# Patient Record
Sex: MALE | Race: BLACK OR AFRICAN AMERICAN | URBAN - METROPOLITAN AREA
[De-identification: names, ages, dates, MRNs, and addresses within clinical notes are randomized per-mention and may not be internally consistent; named-entity substitution may affect disease eponyms.]

---

## 2019-04-26 ENCOUNTER — TELEPHONE (OUTPATIENT)
Dept: FAMILY MEDICINE CLINIC | Age: 59
End: 2019-04-26

## 2019-04-26 ENCOUNTER — OFFICE VISIT (OUTPATIENT)
Dept: FAMILY MEDICINE CLINIC | Age: 59
End: 2019-04-26

## 2019-04-26 VITALS
SYSTOLIC BLOOD PRESSURE: 192 MMHG | WEIGHT: 274 LBS | OXYGEN SATURATION: 95 % | DIASTOLIC BLOOD PRESSURE: 105 MMHG | RESPIRATION RATE: 16 BRPM | TEMPERATURE: 98.1 F | HEIGHT: 73 IN | HEART RATE: 70 BPM | BODY MASS INDEX: 36.31 KG/M2

## 2019-04-26 DIAGNOSIS — E11.65 TYPE 2 DIABETES MELLITUS WITH HYPERGLYCEMIA, WITH LONG-TERM CURRENT USE OF INSULIN (HCC): Primary | ICD-10-CM

## 2019-04-26 DIAGNOSIS — Z79.4 TYPE 2 DIABETES MELLITUS WITH HYPERGLYCEMIA, WITH LONG-TERM CURRENT USE OF INSULIN (HCC): Primary | ICD-10-CM

## 2019-04-26 DIAGNOSIS — E78.2 MIXED HYPERLIPIDEMIA: ICD-10-CM

## 2019-04-26 DIAGNOSIS — I10 ESSENTIAL HYPERTENSION: ICD-10-CM

## 2019-04-26 PROBLEM — E66.01 SEVERE OBESITY (HCC): Status: ACTIVE | Noted: 2019-04-26

## 2019-04-26 RX ORDER — METOPROLOL SUCCINATE 100 MG/1
TABLET, EXTENDED RELEASE ORAL DAILY
COMMUNITY
End: 2019-04-26 | Stop reason: SDUPTHER

## 2019-04-26 RX ORDER — INSULIN LISPRO 100 [IU]/ML
INJECTION, SOLUTION INTRAVENOUS; SUBCUTANEOUS
COMMUNITY

## 2019-04-26 RX ORDER — INSULIN PUMP SYRINGE, 3 ML
EACH MISCELLANEOUS
Qty: 1 KIT | Refills: 0 | Status: SHIPPED | OUTPATIENT
Start: 2019-04-26

## 2019-04-26 RX ORDER — ATORVASTATIN CALCIUM 20 MG/1
20 TABLET, FILM COATED ORAL
Qty: 30 TAB | Refills: 5 | Status: SHIPPED | OUTPATIENT
Start: 2019-04-26 | End: 2019-05-14

## 2019-04-26 RX ORDER — ATORVASTATIN CALCIUM 20 MG/1
TABLET, FILM COATED ORAL DAILY
COMMUNITY
End: 2019-04-26 | Stop reason: SDUPTHER

## 2019-04-26 RX ORDER — FUROSEMIDE 20 MG/1
TABLET ORAL DAILY
COMMUNITY
End: 2019-04-26

## 2019-04-26 RX ORDER — CLONIDINE HYDROCHLORIDE 0.2 MG/1
0.2 TABLET ORAL 2 TIMES DAILY
Qty: 60 TAB | Refills: 5 | Status: SHIPPED | OUTPATIENT
Start: 2019-04-26 | End: 2019-10-27 | Stop reason: SDUPTHER

## 2019-04-26 RX ORDER — LISINOPRIL 20 MG/1
TABLET ORAL DAILY
COMMUNITY
End: 2019-04-26

## 2019-04-26 RX ORDER — NIFEDIPINE 90 MG/1
90 TABLET, FILM COATED, EXTENDED RELEASE ORAL DAILY
COMMUNITY
End: 2019-04-26

## 2019-04-26 RX ORDER — METOPROLOL SUCCINATE 100 MG/1
100 TABLET, EXTENDED RELEASE ORAL DAILY
Qty: 30 TAB | Refills: 5 | Status: SHIPPED | OUTPATIENT
Start: 2019-04-26 | End: 2019-10-27 | Stop reason: SDUPTHER

## 2019-04-26 RX ORDER — LISINOPRIL 40 MG/1
40 TABLET ORAL DAILY
Qty: 30 TAB | Refills: 1 | Status: SHIPPED | OUTPATIENT
Start: 2019-04-26 | End: 2019-07-01 | Stop reason: SDUPTHER

## 2019-04-26 RX ORDER — LANCETS
EACH MISCELLANEOUS
Qty: 200 EACH | Refills: 11 | Status: SHIPPED | OUTPATIENT
Start: 2019-04-26

## 2019-04-26 RX ORDER — CLONIDINE HYDROCHLORIDE 0.2 MG/1
TABLET ORAL 2 TIMES DAILY
COMMUNITY
End: 2019-04-26 | Stop reason: SDUPTHER

## 2019-04-26 NOTE — PROGRESS NOTES
Chief Complaint Patient presents with  New Patient [facility] Establish Care Patient presents in office today as a NP to establish care. Would like to review his current medications to determine if he should continue his current regimen. Needs refills but would like to discuss with provider first. 
No other concerns. Learning Assessment 4/26/2019 PRIMARY LEARNER Patient PRIMARY LANGUAGE ENGLISH  
LEARNER PREFERENCE PRIMARY DEMONSTRATION  
ANSWERED BY self RELATIONSHIP SELF

## 2019-04-26 NOTE — TELEPHONE ENCOUNTER
----- Message from Camille Harvey sent at 2019 11:05 AM EDT -----  Regarding: Dr. Vo [phone]  Pt went to pharmacy to  prescriptions and was told 3 of the medications that he expected to pick were not there. The medications that were not available upon arrival were \"atorvastatin\" \"Lisinopril\" and \"Metoprolo succinate\".  Best contact [phone]

## 2019-04-26 NOTE — PROGRESS NOTES
Jacque Sanders is a 60 y.o. male Chief Complaint Patient presents with  New Patient  Establish Care  
 pt here to establish care and moved from [place]. Pt is a type 2 diabetic. He has run out of his water pill and his BP med nifedipine medication. Since stopping these two his swelling in his legs has gone away. Pt states he is very confused about his meds. Pt is here with his SO. Pt states he is using 31u of rapid acting 4x a day and 26 of long acting once at night. I confirmed again with pt this dosing was correct and he insists that it is. Pt would like to go back to work. Pt eats a very poor diet per SO 
he is a 60y.o. year old male who presents for evalution. Reviewed PmHx, RxHx, FmHx, SocHx, AllgHx and updated and dated in the chart. Review of Systems - negative except as listed above in the HPI Objective:  
 
Vitals:  
 04/26/19 5402 04/26/19 9509 BP: (!) 147/111 (!) 192/105 Pulse: 70 Resp: 16 Temp: 98.1 °F (36.7 °C) TempSrc: Oral   
SpO2: 95% Weight: 274 lb (124.3 kg) Height: 6' 1\" (1.854 m) Current Outpatient Medications Medication Sig  
 insulin lispro (ADMELOG U-100 INSULIN LISPRO) 100 unit/mL injection by SubCUTAneous route.  lisinopril (PRINIVIL, ZESTRIL) 40 mg tablet Take 1 Tab by mouth daily.  metoprolol succinate (TOPROL-XL) 100 mg tablet Take 1 Tab by mouth daily.  atorvastatin (LIPITOR) 20 mg tablet Take 1 Tab by mouth nightly.  cloNIDine HCl (CATAPRES) 0.2 mg tablet Take 1 Tab by mouth two (2) times a day.  Blood-Glucose Meter monitoring kit Check BG 5x daily on short and long acting insulin whichever is covered  lancets misc Check BG 5x daily on short and long acting insulin whichever is covered  glucose blood VI test strips (BLOOD GLUCOSE TEST) strip Check BG 5x daily on short and long acting insulin whichever is covered E11.65  
 insulin glargine U-300 conc (TOUJEO MAX U-300 SOLOSTAR) 300 unit/mL (3 mL) inpn 30 Units by SubCUTAneous route nightly. Whichever is covered by insurance No current facility-administered medications for this visit. Physical Examination: General appearance - alert, well appearing, and in no distress Chest - clear to auscultation, no wheezes, rales or rhonchi, symmetric air entry Heart - normal rate, regular rhythm, normal S1, S2, no murmurs, rubs, clicks or gallops Abdomen - soft, nontender, nondistended, no masses or organomegaly 
obese Extremities - peripheral pulses normal, no pedal edema, no clubbing or cyanosis Assessment/ Plan:  
Diagnoses and all orders for this visit: 
 
1. Type 2 diabetes mellitus with hyperglycemia, with long-term current use of insulin (HCC) -     LIPID PANEL 
-     METABOLIC PANEL, COMPREHENSIVE 
-     HEMOGLOBIN A1C WITH EAG 
-     MICROALBUMIN, UR, RAND W/ MICROALB/CREAT RATIO 
-     FUNDUS PHOTOGRAPHY 
-     Blood-Glucose Meter monitoring kit; Check BG 5x daily on short and long acting insulin whichever is covered 
-     lancets misc; Check BG 5x daily on short and long acting insulin whichever is covered 
-     glucose blood VI test strips (BLOOD GLUCOSE TEST) strip; Check BG 5x daily on short and long acting insulin whichever is covered E11.65 
-     insulin glargine U-300 conc (TOUJEO MAX U-300 SOLOSTAR) 300 unit/mL (3 mL) inpn; 30 Units by SubCUTAneous route nightly. Whichever is covered by insurance 2. Mixed hyperlipidemia -     LIPID PANEL 
-     METABOLIC PANEL, COMPREHENSIVE 
-     atorvastatin (LIPITOR) 20 mg tablet; Take 1 Tab by mouth nightly. 3. Essential hypertension 
-     lisinopril (PRINIVIL, ZESTRIL) 40 mg tablet; Take 1 Tab by mouth daily. -     metoprolol succinate (TOPROL-XL) 100 mg tablet; Take 1 Tab by mouth daily. -     cloNIDine HCl (CATAPRES) 0.2 mg tablet; Take 1 Tab by mouth two (2) times a day.  
 
 do not feel comfortable with dosing of short acting and pt is a poor historian so there is concern he may be incorrect with 31u rapid acting 45x daily. Will hold this for now check labs have pt f/u with glucose log in 1 week and change based on labs and log. Follow-up and Dispositions · Return in about 1 week (around 5/3/2019), or if symptoms worsen or fail to improve. I have discussed the diagnosis with the patient and the intended plan as seen in the above orders. The patient has received an after-visit summary and questions were answered concerning future plans. Pt conveyed understanding of plan. Medication Side Effects and Warnings were discussed with patient [id number] [facility] [address], DO

## 2019-04-26 NOTE — TELEPHONE ENCOUNTER
Called and spoke with pharmacy and confirmed the 3 medications were not showing up in their system. Gave verbal order for them exactly as ordered . Patient aware.

## 2019-04-26 NOTE — PATIENT INSTRUCTIONS

## 2019-04-27 LAB
ALBUMIN SERPL-MCNC: 4 G/DL (ref 3.5–5.5)
ALBUMIN/CREAT UR: 150.2 MG/G CREAT (ref 0–30)
ALBUMIN/GLOB SERPL: 1 {RATIO} (ref 1.2–2.2)
ALP SERPL-CCNC: 103 IU/L (ref 39–117)
ALT SERPL-CCNC: 21 IU/L (ref 0–44)
AST SERPL-CCNC: 15 IU/L (ref 0–40)
BILIRUB SERPL-MCNC: 1.2 MG/DL (ref 0–1.2)
BUN SERPL-MCNC: 11 MG/DL (ref 6–24)
BUN/CREAT SERPL: 8 (ref 9–20)
CALCIUM SERPL-MCNC: 8.9 MG/DL (ref 8.7–10.2)
CHLORIDE SERPL-SCNC: 102 MMOL/L (ref 96–106)
CHOLEST SERPL-MCNC: 177 MG/DL (ref 100–199)
CO2 SERPL-SCNC: 24 MMOL/L (ref 20–29)
CREAT SERPL-MCNC: 1.31 MG/DL (ref 0.76–1.27)
CREAT UR-MCNC: 313.6 MG/DL
EST. AVERAGE GLUCOSE BLD GHB EST-MCNC: 174 MG/DL
GLOBULIN SER CALC-MCNC: 3.9 G/DL (ref 1.5–4.5)
GLUCOSE SERPL-MCNC: 183 MG/DL (ref 65–99)
HBA1C MFR BLD: 7.7 % (ref 4.8–5.6)
HDLC SERPL-MCNC: 36 MG/DL
INTERPRETATION, 910389: NORMAL
INTERPRETATION: NORMAL
LDLC SERPL CALC-MCNC: 117 MG/DL (ref 0–99)
Lab: NORMAL
MICROALBUMIN UR-MCNC: 471 UG/ML
PDF IMAGE, 910387: NORMAL
POTASSIUM SERPL-SCNC: 3.9 MMOL/L (ref 3.5–5.2)
PROT SERPL-MCNC: 7.9 G/DL (ref 6–8.5)
SODIUM SERPL-SCNC: 141 MMOL/L (ref 134–144)
TRIGL SERPL-MCNC: 118 MG/DL (ref 0–149)
VLDLC SERPL CALC-MCNC: 24 MG/DL (ref 5–40)

## 2019-04-29 DIAGNOSIS — Z79.4 TYPE 2 DIABETES MELLITUS WITH HYPERGLYCEMIA, WITH LONG-TERM CURRENT USE OF INSULIN (HCC): Primary | ICD-10-CM

## 2019-04-29 DIAGNOSIS — E11.65 TYPE 2 DIABETES MELLITUS WITH HYPERGLYCEMIA, WITH LONG-TERM CURRENT USE OF INSULIN (HCC): Primary | ICD-10-CM

## 2019-04-29 RX ORDER — METFORMIN HYDROCHLORIDE 500 MG/1
500 TABLET ORAL 2 TIMES DAILY WITH MEALS
Qty: 60 TAB | Refills: 3 | Status: SHIPPED | OUTPATIENT
Start: 2019-04-29 | End: 2019-08-27 | Stop reason: SDUPTHER

## 2019-04-29 NOTE — PROGRESS NOTES
Diabetes at 7.7 and his renal function is fine for oral meds. I sent in metformin 500 bid with meals. C/w long acting insulin. If he is actively taking the lipitor 20 we should increase to 40 due to chol still too high.   If agreeable I will send in

## 2019-04-30 NOTE — PROGRESS NOTES
Patient id x 3, notified of labs and verbalized understanding. States that he agrees to metformin. Also states that he had been out of Lipitor for a month, is now restarting. Advised to return for labs in 3 months.

## 2019-05-01 LAB
LEFT EYE DIABETIC RETINOPATHY: ABNORMAL
LEFT EYE IMAGE QUALITY: ABNORMAL
LEFT EYE MACULAR EDEMA: ABNORMAL
LEFT EYE OTHER RETINOPATHY: ABNORMAL
RESULT: ABNORMAL
RIGHT EYE DIABETIC RETINOPATHY: ABNORMAL
RIGHT EYE IMAGE QUALITY: ABNORMAL
RIGHT EYE MACULAR EDEMA: ABNORMAL
RIGHT EYE OTHER RETINOPATHY: ABNORMAL
SEVERITY: ABNORMAL

## 2019-05-14 ENCOUNTER — OFFICE VISIT (OUTPATIENT)
Dept: FAMILY MEDICINE CLINIC | Age: 59
End: 2019-05-14

## 2019-05-14 VITALS
RESPIRATION RATE: 16 BRPM | SYSTOLIC BLOOD PRESSURE: 157 MMHG | DIASTOLIC BLOOD PRESSURE: 88 MMHG | WEIGHT: 264 LBS | HEART RATE: 88 BPM | HEIGHT: 73 IN | TEMPERATURE: 97.9 F | OXYGEN SATURATION: 95 % | BODY MASS INDEX: 34.99 KG/M2

## 2019-05-14 DIAGNOSIS — E11.65 TYPE 2 DIABETES MELLITUS WITH HYPERGLYCEMIA, WITH LONG-TERM CURRENT USE OF INSULIN (HCC): ICD-10-CM

## 2019-05-14 DIAGNOSIS — E78.2 MIXED HYPERLIPIDEMIA: ICD-10-CM

## 2019-05-14 DIAGNOSIS — Z79.4 TYPE 2 DIABETES MELLITUS WITH HYPERGLYCEMIA, WITH LONG-TERM CURRENT USE OF INSULIN (HCC): ICD-10-CM

## 2019-05-14 DIAGNOSIS — I10 ESSENTIAL HYPERTENSION: Primary | ICD-10-CM

## 2019-05-14 RX ORDER — ATORVASTATIN CALCIUM 40 MG/1
40 TABLET, FILM COATED ORAL
Qty: 30 TAB | Refills: 5 | Status: SHIPPED | OUTPATIENT
Start: 2019-05-14 | End: 2019-10-28 | Stop reason: SDUPTHER

## 2019-05-14 RX ORDER — INSULIN GLARGINE 100 [IU]/ML
30 INJECTION, SOLUTION SUBCUTANEOUS DAILY
Qty: 3 PEN | Refills: 5 | Status: SHIPPED | OUTPATIENT
Start: 2019-05-14 | End: 2019-08-30 | Stop reason: SDUPTHER

## 2019-05-14 RX ORDER — AMLODIPINE BESYLATE 5 MG/1
5 TABLET ORAL DAILY
Qty: 30 TAB | Refills: 1 | Status: SHIPPED | OUTPATIENT
Start: 2019-05-14 | End: 2019-06-11 | Stop reason: SDUPTHER

## 2019-05-14 NOTE — PROGRESS NOTES
Roe Pride is a 60 y.o. male had concerns including Follow-up and Back Pain. pt here for follow up and is takijg bp meds and his bp is improved but remains elevated. Pt has no issues with meds. His toujeo is not covered but was given a one time fill, seems lantus is preferred and will write this. Pt is also going to make a f/u with optho. Pt admits he has not been following a diabetic diet at all and has been eating candy etc.  Pt did not start 40 of lipitor and will send this in and recheck 2 months with diabetes. LDL remained above goal despite statin therapy. Again diet discussed    he is a 60y.o. year old male who presents for evalution. Reviewed PmHx, RxHx, FmHx, SocHx, AllgHx and updated and dated in the chart. Review of Systems - negative except as listed above in the HPI    Objective:     Vitals:    05/14/19 1344 05/14/19 1409   BP: (!) 160/105 157/88   Pulse: 88    Resp: 16    Temp: 97.9 °F (36.6 °C)    TempSrc: Oral    SpO2: 95%    Weight: 264 lb (119.7 kg)    Height: 6' 1\" (1.854 m)        Current Outpatient Medications   Medication Sig    amLODIPine (NORVASC) 5 mg tablet Take 1 Tab by mouth daily.  insulin glargine (LANTUS,BASAGLAR) 100 unit/mL (3 mL) inpn 30 Units by SubCUTAneous route daily.  atorvastatin (LIPITOR) 40 mg tablet Take 1 Tab by mouth nightly.  metFORMIN (GLUCOPHAGE) 500 mg tablet Take 1 Tab by mouth two (2) times daily (with meals).  insulin lispro (ADMELOG U-100 INSULIN LISPRO) 100 unit/mL injection by SubCUTAneous route.  lisinopril (PRINIVIL, ZESTRIL) 40 mg tablet Take 1 Tab by mouth daily.  metoprolol succinate (TOPROL-XL) 100 mg tablet Take 1 Tab by mouth daily.  cloNIDine HCl (CATAPRES) 0.2 mg tablet Take 1 Tab by mouth two (2) times a day.     Blood-Glucose Meter monitoring kit Check BG 5x daily on short and long acting insulin whichever is covered    lancets misc Check BG 5x daily on short and long acting insulin whichever is covered   Tariq glucose blood VI test strips (BLOOD GLUCOSE TEST) strip Check BG 5x daily on short and long acting insulin whichever is covered E11.65     No current facility-administered medications for this visit. Physical Examination: General appearance - alert, well appearing, and in no distress  Chest - clear to auscultation, no wheezes, rales or rhonchi, symmetric air entry  Heart - normal rate, regular rhythm, normal S1, S2, no murmurs, rubs, clicks or gallops      Assessment/ Plan:   Diagnoses and all orders for this visit:    1. Essential hypertension  -     amLODIPine (NORVASC) 5 mg tablet; Take 1 Tab by mouth daily. 2. Type 2 diabetes mellitus with hyperglycemia, with long-term current use of insulin (HCC)  -     insulin glargine (LANTUS,BASAGLAR) 100 unit/mL (3 mL) inpn; 30 Units by SubCUTAneous route daily. 3. Mixed hyperlipidemia  -     atorvastatin (LIPITOR) 40 mg tablet; Take 1 Tab by mouth nightly. Follow-up and Dispositions    · Return in about 1 month (around 6/11/2019), or if symptoms worsen or fail to improve, for bp check. I have discussed the diagnosis with the patient and the intended plan as seen in the above orders. The patient has received an after-visit summary and questions were answered concerning future plans. Pt conveyed understanding of plan.     Medication Side Effects and Warnings were discussed with patient      Ashley Elizabeth,

## 2019-05-14 NOTE — PATIENT INSTRUCTIONS
A Healthy Lifestyle: Care Instructions  Your Care Instructions    A healthy lifestyle can help you feel good, stay at a healthy weight, and have plenty of energy for both work and play. A healthy lifestyle is something you can share with your whole family. A healthy lifestyle also can lower your risk for serious health problems, such as high blood pressure, heart disease, and diabetes. You can follow a few steps listed below to improve your health and the health of your family. Follow-up care is a key part of your treatment and safety. Be sure to make and go to all appointments, and call your doctor if you are having problems. It's also a good idea to know your test results and keep a list of the medicines you take. How can you care for yourself at home? · Do not eat too much sugar, fat, or fast foods. You can still have dessert and treats now and then. The goal is moderation. · Start small to improve your eating habits. Pay attention to portion sizes, drink less juice and soda pop, and eat more fruits and vegetables. ? Eat a healthy amount of food. A 3-ounce serving of meat, for example, is about the size of a deck of cards. Fill the rest of your plate with vegetables and whole grains. ? Limit the amount of soda and sports drinks you have every day. Drink more water when you are thirsty. ? Eat at least 5 servings of fruits and vegetables every day. It may seem like a lot, but it is not hard to reach this goal. A serving or helping is 1 piece of fruit, 1 cup of vegetables, or 2 cups of leafy, raw vegetables. Have an apple or some carrot sticks as an afternoon snack instead of a candy bar. Try to have fruits and/or vegetables at every meal.  · Make exercise part of your daily routine. You may want to start with simple activities, such as walking, bicycling, or slow swimming. Try to be active 30 to 60 minutes every day. You do not need to do all 30 to 60 minutes all at once.  For example, you can exercise 3 times a day for 10 or 20 minutes. Moderate exercise is safe for most people, but it is always a good idea to talk to your doctor before starting an exercise program.  · Keep moving. [place] the lawn, work in the garden, or [employer]. Take the stairs instead of the elevator at work. · If you smoke, quit. People who smoke have an increased risk for heart attack, stroke, cancer, and other lung illnesses. Quitting is hard, but there are ways to boost your chance of quitting tobacco for good. ? Use nicotine gum, patches, or lozenges. ? Ask your doctor about stop-smoking programs and medicines. ? Keep trying. In addition to reducing your risk of diseases in the future, you will notice some benefits soon after you stop using tobacco. If you have shortness of breath or asthma symptoms, they will likely get better within a few weeks after you quit. · Limit how much alcohol you drink. Moderate amounts of alcohol (up to 2 drinks a day for men, 1 drink a day for women) are okay. But drinking too much can lead to liver problems, high blood pressure, and other health problems. Family health  If you have a family, there are many things you can do together to improve your health. · Eat meals together as a family as often as possible. · Eat healthy foods. This includes fruits, vegetables, lean meats and dairy, and whole grains. · Include your family in your fitness plan. Most people think of activities such as jogging or tennis as the way to fitness, but there are many ways you and your family can be more active. Anything that makes you breathe hard and gets your heart pumping is exercise. Here are some tips:  ? Walk to do errands or to take your child to school or the bus.  ? Go for a family bike ride after dinner instead of watching TV. Where can you learn more? Go to http://ajit-melissa.info/. Enter Y535 in the search box to learn more about \"A Healthy Lifestyle: Care Instructions. \"  Current as of: September 11, 2018  Content Version: 11.9  © 9411-4449 [employer]. Care instructions adapted under license by [employer] (which disclaims liability or warranty for this information). If you have questions about a medical condition or this instruction, always ask your healthcare professional. [address] any warranty or liability for your use of this information.

## 2019-05-14 NOTE — PROGRESS NOTES
Chief Complaint   Patient presents with    Follow-up    Back Pain     Patient presents in office today for 3 week f/u. Has c/o back pain. States that it keeps him up all night. No other concerns. 1. Have you been to the ER, urgent care clinic since your last visit? Hospitalized since your last visit? No    2. Have you seen or consulted any other health care providers outside of the [address] [place] [address] since your last visit? Include any pap smears or colon screening.  No    Learning Assessment 4/26/2019   PRIMARY LEARNER Patient   PRIMARY LANGUAGE ENGLISH   LEARNER PREFERENCE PRIMARY DEMONSTRATION   ANSWERED BY self   RELATIONSHIP SELF

## 2019-06-11 ENCOUNTER — OFFICE VISIT (OUTPATIENT)
Dept: FAMILY MEDICINE CLINIC | Age: 59
End: 2019-06-11

## 2019-06-11 VITALS
HEART RATE: 81 BPM | HEIGHT: 73 IN | DIASTOLIC BLOOD PRESSURE: 83 MMHG | TEMPERATURE: 98.1 F | SYSTOLIC BLOOD PRESSURE: 171 MMHG | RESPIRATION RATE: 18 BRPM | BODY MASS INDEX: 35.04 KG/M2 | WEIGHT: 264.4 LBS | OXYGEN SATURATION: 94 %

## 2019-06-11 DIAGNOSIS — I10 ESSENTIAL HYPERTENSION: ICD-10-CM

## 2019-06-11 RX ORDER — AMLODIPINE BESYLATE 10 MG/1
10 TABLET ORAL DAILY
Qty: 30 TAB | Refills: 1 | Status: SHIPPED | OUTPATIENT
Start: 2019-06-11

## 2019-06-11 NOTE — PROGRESS NOTES
Jacque Sanders is a 60 y.o. male    Chief Complaint   Patient presents with    Hypertension    Diabetes     1. Have you been to the ER, urgent care clinic since your last visit? Hospitalized since your last visit? No    2. Have you seen or consulted any other health care providers outside of the [address] [place] [address] since your last visit? Include any pap smears or colon screening.  No    Visit Vitals  BP (!) 177/102 (BP 1 Location: Left arm, BP Patient Position: Sitting)   Pulse 81   Temp 98.1 °F (36.7 °C) (Oral)   Resp 18   Ht 6' 1\" (1.854 m)   Wt 264 lb 6.4 oz (119.9 kg)   SpO2 94%   BMI 34.88 kg/m²

## 2019-06-11 NOTE — PROGRESS NOTES
Benjie Moeller is a 60 y.o. male   Chief Complaint   Patient presents with    Hypertension    Diabetes    pt here for follow up and is taking the norvasc 5mg once daily. Pt states he is also under increased stress. Pt is living with SO and states his girlfriends grandchildren are causing legal issues with police at house frequently. Pt currently on SSI and he would like to go back to work. Pt states on SSI for MI's. Pt is going to seek employment. he is a 60y.o. year old male who presents for evalution. Reviewed PmHx, RxHx, FmHx, SocHx, AllgHx and updated and dated in the chart. Review of Systems - negative except as listed above in the HPI    Objective:     Vitals:    06/11/19 1346 06/11/19 1417   BP: (!) 177/102 171/83   Pulse: 81    Resp: 18    Temp: 98.1 °F (36.7 °C)    TempSrc: Oral    SpO2: 94%    Weight: 264 lb 6.4 oz (119.9 kg)    Height: 6' 1\" (1.854 m)        Current Outpatient Medications   Medication Sig    amLODIPine (NORVASC) 10 mg tablet Take 1 Tab by mouth daily.  insulin glargine (LANTUS,BASAGLAR) 100 unit/mL (3 mL) inpn 30 Units by SubCUTAneous route daily.  atorvastatin (LIPITOR) 40 mg tablet Take 1 Tab by mouth nightly.  metFORMIN (GLUCOPHAGE) 500 mg tablet Take 1 Tab by mouth two (2) times daily (with meals).  lisinopril (PRINIVIL, ZESTRIL) 40 mg tablet Take 1 Tab by mouth daily.  metoprolol succinate (TOPROL-XL) 100 mg tablet Take 1 Tab by mouth daily.  cloNIDine HCl (CATAPRES) 0.2 mg tablet Take 1 Tab by mouth two (2) times a day.  Blood-Glucose Meter monitoring kit Check BG 5x daily on short and long acting insulin whichever is covered    lancets misc Check BG 5x daily on short and long acting insulin whichever is covered    glucose blood VI test strips (BLOOD GLUCOSE TEST) strip Check BG 5x daily on short and long acting insulin whichever is covered E11.65    insulin lispro (ADMELOG U-100 INSULIN LISPRO) 100 unit/mL injection by SubCUTAneous route. No current facility-administered medications for this visit. Physical Examination: General appearance - alert, well appearing, and in no distress  Mental status - alert, oriented to person, place, and time  Chest - clear to auscultation, no wheezes, rales or rhonchi, symmetric air entry  Heart - normal rate, regular rhythm, normal S1, S2, no murmurs, rubs, clicks or gallops      Assessment/ Plan:   Diagnoses and all orders for this visit:    1. Essential hypertension  -     amLODIPine (NORVASC) 10 mg tablet; Take 1 Tab by mouth daily. Follow-up and Dispositions    · Return in about 1 month (around 7/9/2019), or if symptoms worsen or fail to improve. I have discussed the diagnosis with the patient and the intended plan as seen in the above orders. The patient has received an after-visit summary and questions were answered concerning future plans. Pt conveyed understanding of plan.     Medication Side Effects and Warnings were discussed with patient      Baudilio Mcgowan, DO

## 2019-06-11 NOTE — PATIENT INSTRUCTIONS
A Healthy Lifestyle: Care Instructions Your Care Instructions A healthy lifestyle can help you feel good, stay at a healthy weight, and have plenty of energy for both work and play. A healthy lifestyle is something you can share with your whole family. A healthy lifestyle also can lower your risk for serious health problems, such as high blood pressure, heart disease, and diabetes. You can follow a few steps listed below to improve your health and the health of your family. Follow-up care is a key part of your treatment and safety. Be sure to make and go to all appointments, and call your doctor if you are having problems. It's also a good idea to know your test results and keep a list of the medicines you take. How can you care for yourself at home? · Do not eat too much sugar, fat, or fast foods. You can still have dessert and treats now and then. The goal is moderation. · Start small to improve your eating habits. Pay attention to portion sizes, drink less juice and soda pop, and eat more fruits and vegetables. ? Eat a healthy amount of food. A 3-ounce serving of meat, for example, is about the size of a deck of cards. Fill the rest of your plate with vegetables and whole grains. ? Limit the amount of soda and sports drinks you have every day. Drink more water when you are thirsty. ? Eat at least 5 servings of fruits and vegetables every day. It may seem like a lot, but it is not hard to reach this goal. A serving or helping is 1 piece of fruit, 1 cup of vegetables, or 2 cups of leafy, raw vegetables. Have an apple or some carrot sticks as an afternoon snack instead of a candy bar. Try to have fruits and/or vegetables at every meal. 
· Make exercise part of your daily routine. You may want to start with simple activities, such as walking, bicycling, or slow swimming. Try to be active 30 to 60 minutes every day.  You do not need to do all 30 to 60 minutes all at once. For example, you can exercise 3 times a day for 10 or 20 minutes. Moderate exercise is safe for most people, but it is always a good idea to talk to your doctor before starting an exercise program. 
· Keep moving. [place] the lawn, work in the garden, or [employer]. Take the stairs instead of the elevator at work. · If you smoke, quit. People who smoke have an increased risk for heart attack, stroke, cancer, and other lung illnesses. Quitting is hard, but there are ways to boost your chance of quitting tobacco for good. ? Use nicotine gum, patches, or lozenges. ? Ask your doctor about stop-smoking programs and medicines. ? Keep trying. In addition to reducing your risk of diseases in the future, you will notice some benefits soon after you stop using tobacco. If you have shortness of breath or asthma symptoms, they will likely get better within a few weeks after you quit. · Limit how much alcohol you drink. Moderate amounts of alcohol (up to 2 drinks a day for men, 1 drink a day for women) are okay. But drinking too much can lead to liver problems, high blood pressure, and other health problems. Family health If you have a family, there are many things you can do together to improve your health. · Eat meals together as a family as often as possible. · Eat healthy foods. This includes fruits, vegetables, lean meats and dairy, and whole grains. · Include your family in your fitness plan. Most people think of activities such as jogging or tennis as the way to fitness, but there are many ways you and your family can be more active. Anything that makes you breathe hard and gets your heart pumping is exercise. Here are some tips: 
? Walk to do errands or to take your child to school or the bus. 
? Go for a family bike ride after dinner instead of watching TV. Where can you learn more? Go to http://ajit-melissa.info/. Enter A943 in the search box to learn more about \"A Healthy Lifestyle: Care Instructions. \" Current as of: September 11, 2018 Content Version: 11.9 © 7580-9484 [employer]. Care instructions adapted under license by [employer] (which disclaims liability or warranty for this information). If you have questions about a medical condition or this instruction, always ask your healthcare professional. [place] [address] any warranty or liability for your use of this information.

## 2019-07-01 DIAGNOSIS — I10 ESSENTIAL HYPERTENSION: ICD-10-CM

## 2019-07-02 RX ORDER — LISINOPRIL 40 MG/1
TABLET ORAL
Qty: 30 TAB | Refills: 1 | Status: SHIPPED | OUTPATIENT
Start: 2019-07-02 | End: 2019-08-02 | Stop reason: SDUPTHER

## 2019-07-05 ENCOUNTER — OFFICE VISIT (OUTPATIENT)
Dept: FAMILY MEDICINE CLINIC | Age: 59
End: 2019-07-05

## 2019-07-05 VITALS
HEIGHT: 73 IN | SYSTOLIC BLOOD PRESSURE: 146 MMHG | BODY MASS INDEX: 34.19 KG/M2 | HEART RATE: 96 BPM | OXYGEN SATURATION: 96 % | RESPIRATION RATE: 16 BRPM | WEIGHT: 258 LBS | TEMPERATURE: 98.2 F | DIASTOLIC BLOOD PRESSURE: 90 MMHG

## 2019-07-05 DIAGNOSIS — I10 ESSENTIAL HYPERTENSION: Primary | ICD-10-CM

## 2019-07-05 DIAGNOSIS — M17.11 PRIMARY OSTEOARTHRITIS OF RIGHT KNEE: ICD-10-CM

## 2019-07-05 RX ORDER — CELECOXIB 200 MG/1
200 CAPSULE ORAL 2 TIMES DAILY
Qty: 60 CAP | Refills: 5 | Status: SHIPPED | OUTPATIENT
Start: 2019-07-05

## 2019-07-05 RX ORDER — CHLORTHALIDONE 25 MG/1
25 TABLET ORAL DAILY
Qty: 60 TAB | Refills: 1 | Status: SHIPPED | OUTPATIENT
Start: 2019-07-05 | End: 2019-08-29

## 2019-07-05 NOTE — PROGRESS NOTES
Hai Ahr is a 60 y.o. male   Chief Complaint   Patient presents with    Follow-up    pt here for BP f/u and is taking the norvasc 10mg once daily with no effect. BP has come down slightly but remains above goal.    Pt also with R knee arthritis and states had x-ray before and was told arthritis. Pt is not taking anything for this. Pt does have a slightly elevated Cr and anisha ltrial celebrex  he is a 60y.o. year old male who presents for evalution. Reviewed PmHx, RxHx, FmHx, SocHx, AllgHx and updated and dated in the chart. Review of Systems - negative except as listed above in the HPI    Objective:     Vitals:    07/05/19 1325 07/05/19 1339   BP: (!) 176/92 146/90   Pulse: 96    Resp: 16    Temp: 98.2 °F (36.8 °C)    TempSrc: Oral    SpO2: 96%    Weight: 258 lb (117 kg)    Height: 6' 1\" (1.854 m)        Current Outpatient Medications   Medication Sig    chlorthalidone (HYGROTEN) 25 mg tablet Take 1 Tab by mouth daily.  celecoxib (CELEBREX) 200 mg capsule Take 1 Cap by mouth two (2) times a day.  lisinopril (PRINIVIL, ZESTRIL) 40 mg tablet TAKE 1 TABLET BY MOUTH EVERY DAY    amLODIPine (NORVASC) 10 mg tablet Take 1 Tab by mouth daily.  insulin glargine (LANTUS,BASAGLAR) 100 unit/mL (3 mL) inpn 30 Units by SubCUTAneous route daily.  atorvastatin (LIPITOR) 40 mg tablet Take 1 Tab by mouth nightly.  metFORMIN (GLUCOPHAGE) 500 mg tablet Take 1 Tab by mouth two (2) times daily (with meals).  insulin lispro (ADMELOG U-100 INSULIN LISPRO) 100 unit/mL injection by SubCUTAneous route.  metoprolol succinate (TOPROL-XL) 100 mg tablet Take 1 Tab by mouth daily.  cloNIDine HCl (CATAPRES) 0.2 mg tablet Take 1 Tab by mouth two (2) times a day.     Blood-Glucose Meter monitoring kit Check BG 5x daily on short and long acting insulin whichever is covered    lancets misc Check BG 5x daily on short and long acting insulin whichever is covered    glucose blood VI test strips (BLOOD GLUCOSE TEST) strip Check BG 5x daily on short and long acting insulin whichever is covered E11.65     No current facility-administered medications for this visit. Physical Examination: General appearance - alert, well appearing, and in no distress  Mental status - alert, oriented to person, place, and time  Chest - clear to auscultation, no wheezes, rales or rhonchi, symmetric air entry  Heart - normal rate, regular rhythm, normal S1, S2, no murmurs, rubs, clicks or gallops      Assessment/ Plan:   Diagnoses and all orders for this visit:    1. Essential hypertension  -     chlorthalidone (HYGROTEN) 25 mg tablet; Take 1 Tab by mouth daily. 2. Primary osteoarthritis of right knee  -     celecoxib (CELEBREX) 200 mg capsule; Take 1 Cap by mouth two (2) times a day. Follow-up and Dispositions    · Return in about 3 weeks (around 7/26/2019), or if symptoms worsen or fail to improve. I have discussed the diagnosis with the patient and the intended plan as seen in the above orders. The patient has received an after-visit summary and questions were answered concerning future plans. Pt conveyed understanding of plan.     Medication Side Effects and Warnings were discussed with patient      Esau Brian DO

## 2019-07-05 NOTE — PROGRESS NOTES
Chief Complaint   Patient presents with    Follow-up     Patient presents in office today for 1 month f/u. States that he still has the knee pain. No other concerns. 1. Have you been to the ER, urgent care clinic since your last visit? Hospitalized since your last visit? No    2. Have you seen or consulted any other health care providers outside of the [address] [place] [address] since your last visit? Include any pap smears or colon screening.  No    Learning Assessment 4/26/2019   PRIMARY LEARNER Patient   PRIMARY LANGUAGE ENGLISH   LEARNER PREFERENCE PRIMARY DEMONSTRATION   ANSWERED BY self   RELATIONSHIP SELF

## 2019-07-05 NOTE — PATIENT INSTRUCTIONS
A Healthy Lifestyle: Care Instructions  Your Care Instructions    A healthy lifestyle can help you feel good, stay at a healthy weight, and have plenty of energy for both work and play. A healthy lifestyle is something you can share with your whole family. A healthy lifestyle also can lower your risk for serious health problems, such as high blood pressure, heart disease, and diabetes. You can follow a few steps listed below to improve your health and the health of your family. Follow-up care is a key part of your treatment and safety. Be sure to make and go to all appointments, and call your doctor if you are having problems. It's also a good idea to know your test results and keep a list of the medicines you take. How can you care for yourself at home? · Do not eat too much sugar, fat, or fast foods. You can still have dessert and treats now and then. The goal is moderation. · Start small to improve your eating habits. Pay attention to portion sizes, drink less juice and soda pop, and eat more fruits and vegetables. ? Eat a healthy amount of food. A 3-ounce serving of meat, for example, is about the size of a deck of cards. Fill the rest of your plate with vegetables and whole grains. ? Limit the amount of soda and sports drinks you have every day. Drink more water when you are thirsty. ? Eat at least 5 servings of fruits and vegetables every day. It may seem like a lot, but it is not hard to reach this goal. A serving or helping is 1 piece of fruit, 1 cup of vegetables, or 2 cups of leafy, raw vegetables. Have an apple or some carrot sticks as an afternoon snack instead of a candy bar. Try to have fruits and/or vegetables at every meal.  · Make exercise part of your daily routine. You may want to start with simple activities, such as walking, bicycling, or slow swimming. Try to be active 30 to 60 minutes every day. You do not need to do all 30 to 60 minutes all at once.  For example, you can exercise 3 times a day for 10 or 20 minutes. Moderate exercise is safe for most people, but it is always a good idea to talk to your doctor before starting an exercise program.  · Keep moving. [employer] [place] the lawn, work in the garden, or [employer]. Take the stairs instead of the elevator at work. · If you smoke, quit. People who smoke have an increased risk for heart attack, stroke, cancer, and other lung illnesses. Quitting is hard, but there are ways to boost your chance of quitting tobacco for good. ? Use nicotine gum, patches, or lozenges. ? Ask your doctor about stop-smoking programs and medicines. ? Keep trying. In addition to reducing your risk of diseases in the future, you will notice some benefits soon after you stop using tobacco. If you have shortness of breath or asthma symptoms, they will likely get better within a few weeks after you quit. · Limit how much alcohol you drink. Moderate amounts of alcohol (up to 2 drinks a day for men, 1 drink a day for women) are okay. But drinking too much can lead to liver problems, high blood pressure, and other health problems. Family health  If you have a family, there are many things you can do together to improve your health. · Eat meals together as a family as often as possible. · Eat healthy foods. This includes fruits, vegetables, lean meats and dairy, and whole grains. · Include your family in your fitness plan. Most people think of activities such as jogging or tennis as the way to fitness, but there are many ways you and your family can be more active. Anything that makes you breathe hard and gets your heart pumping is exercise. Here are some tips:  ? Walk to do errands or to take your child to school or the bus.  ? Go for a family bike ride after dinner instead of watching TV. Where can you learn more? Go to http://ajit-melissa.info/. Enter L526 in the search box to learn more about \"A Healthy Lifestyle: Care Instructions. \"  Current as of: September 11, 2018  Content Version: 11.9  © 0761-4177 [employer]. Care instructions adapted under license by [employer] (which disclaims liability or warranty for this information). If you have questions about a medical condition or this instruction, always ask your healthcare professional. [address] any warranty or liability for your use of this information.

## 2019-07-13 DIAGNOSIS — I10 ESSENTIAL HYPERTENSION: ICD-10-CM

## 2019-07-16 RX ORDER — AMLODIPINE BESYLATE 5 MG/1
TABLET ORAL
Qty: 30 TAB | Refills: 1 | Status: SHIPPED | OUTPATIENT
Start: 2019-07-16 | End: 2019-10-12 | Stop reason: SDUPTHER

## 2019-08-02 ENCOUNTER — DOCUMENTATION ONLY (OUTPATIENT)
Dept: FAMILY MEDICINE CLINIC | Age: 59
End: 2019-08-02

## 2019-08-02 DIAGNOSIS — I10 ESSENTIAL HYPERTENSION: ICD-10-CM

## 2019-08-02 RX ORDER — LISINOPRIL 40 MG/1
TABLET ORAL
Qty: 30 TAB | Refills: 1 | Status: SHIPPED | OUTPATIENT
Start: 2019-08-02

## 2019-08-27 DIAGNOSIS — Z79.4 TYPE 2 DIABETES MELLITUS WITH HYPERGLYCEMIA, WITH LONG-TERM CURRENT USE OF INSULIN (HCC): ICD-10-CM

## 2019-08-27 DIAGNOSIS — E11.65 TYPE 2 DIABETES MELLITUS WITH HYPERGLYCEMIA, WITH LONG-TERM CURRENT USE OF INSULIN (HCC): ICD-10-CM

## 2019-08-28 RX ORDER — METFORMIN HYDROCHLORIDE 500 MG/1
TABLET ORAL
Qty: 60 TAB | Refills: 3 | Status: SHIPPED | OUTPATIENT
Start: 2019-08-28

## 2019-08-29 ENCOUNTER — OFFICE VISIT (OUTPATIENT)
Dept: FAMILY MEDICINE CLINIC | Age: 59
End: 2019-08-29

## 2019-08-29 VITALS
WEIGHT: 248 LBS | DIASTOLIC BLOOD PRESSURE: 79 MMHG | OXYGEN SATURATION: 95 % | TEMPERATURE: 98.2 F | HEART RATE: 94 BPM | RESPIRATION RATE: 16 BRPM | BODY MASS INDEX: 32.87 KG/M2 | HEIGHT: 73 IN | SYSTOLIC BLOOD PRESSURE: 134 MMHG

## 2019-08-29 DIAGNOSIS — Z79.4 TYPE 2 DIABETES MELLITUS WITH HYPERGLYCEMIA, WITH LONG-TERM CURRENT USE OF INSULIN (HCC): Primary | ICD-10-CM

## 2019-08-29 DIAGNOSIS — I10 ESSENTIAL HYPERTENSION: ICD-10-CM

## 2019-08-29 DIAGNOSIS — Z11.59 SCREENING FOR VIRAL DISEASE: ICD-10-CM

## 2019-08-29 DIAGNOSIS — E11.65 TYPE 2 DIABETES MELLITUS WITH HYPERGLYCEMIA, WITH LONG-TERM CURRENT USE OF INSULIN (HCC): Primary | ICD-10-CM

## 2019-08-29 RX ORDER — CHLORTHALIDONE 50 MG/1
50 TABLET ORAL DAILY
Qty: 30 TAB | Refills: 1 | Status: SHIPPED | OUTPATIENT
Start: 2019-08-29 | End: 2019-10-27 | Stop reason: SDUPTHER

## 2019-08-29 NOTE — PROGRESS NOTES
Lon Anderson is a 60 y.o. male   Chief Complaint   Patient presents with    Follow-up    pt here for follow up on his BP and remains elevated. Pt is currently on 3 medications for this and tolerates thme fine. Pt is also IDDM and is due for a recheck, last was 7.7. Pt has improved his diet as well.      he is a 60y.o. year old male who presents for evalution. Reviewed PmHx, RxHx, FmHx, SocHx, AllgHx and updated and dated in the chart. Review of Systems - negative except as listed above in the HPI    Objective:     Vitals:    08/29/19 1322 08/29/19 1337   BP: (!) 156/95 134/79   Pulse: 94    Resp: 16    Temp: 98.2 °F (36.8 °C)    TempSrc: Oral    SpO2: 95%    Weight: 248 lb (112.5 kg)    Height: 6' 1\" (1.854 m)        Current Outpatient Medications   Medication Sig    chlorthalidone (HYGROTEN) 50 mg tablet Take 1 Tab by mouth daily.  metFORMIN (GLUCOPHAGE) 500 mg tablet TAKE 1 TABLET BY MOUTH TWICE A DAY WITH MEALS    lisinopril (PRINIVIL, ZESTRIL) 40 mg tablet TAKE 1 TABLET BY MOUTH EVERY DAY    celecoxib (CELEBREX) 200 mg capsule Take 1 Cap by mouth two (2) times a day.  amLODIPine (NORVASC) 10 mg tablet Take 1 Tab by mouth daily.  insulin glargine (LANTUS,BASAGLAR) 100 unit/mL (3 mL) inpn 30 Units by SubCUTAneous route daily.  atorvastatin (LIPITOR) 40 mg tablet Take 1 Tab by mouth nightly.  insulin lispro (ADMELOG U-100 INSULIN LISPRO) 100 unit/mL injection by SubCUTAneous route.  metoprolol succinate (TOPROL-XL) 100 mg tablet Take 1 Tab by mouth daily.  cloNIDine HCl (CATAPRES) 0.2 mg tablet Take 1 Tab by mouth two (2) times a day.     Blood-Glucose Meter monitoring kit Check BG 5x daily on short and long acting insulin whichever is covered    lancets misc Check BG 5x daily on short and long acting insulin whichever is covered    glucose blood VI test strips (BLOOD GLUCOSE TEST) strip Check BG 5x daily on short and long acting insulin whichever is covered E11.65    amLODIPine (NORVASC) 5 mg tablet TAKE 1 TABLET BY MOUTH EVERY DAY     No current facility-administered medications for this visit. Physical Examination: General appearance - alert, well appearing, and in no distress  Chest - clear to auscultation, no wheezes, rales or rhonchi, symmetric air entry  Heart - normal rate, regular rhythm, normal S1, S2, no murmurs, rubs, clicks or gallops      Assessment/ Plan:   Diagnoses and all orders for this visit:    1. Type 2 diabetes mellitus with hyperglycemia, with long-term current use of insulin (HCC)  -     HEMOGLOBIN A1C WITH EAG  -     METABOLIC PANEL, BASIC    2. Essential hypertension  -     chlorthalidone (HYGROTEN) 50 mg tablet; Take 1 Tab by mouth daily. 3. Screening for viral disease  -     HEPATITIS C AB       Follow-up and Dispositions    · Return in about 3 months (around 11/29/2019), or if symptoms worsen or fail to improve. I have discussed the diagnosis with the patient and the intended plan as seen in the above orders. The patient has received an after-visit summary and questions were answered concerning future plans. Pt conveyed understanding of plan.     Medication Side Effects and Warnings were discussed with patient      Mariely Gorman DO

## 2019-08-29 NOTE — PATIENT INSTRUCTIONS
A Healthy Lifestyle: Care Instructions  Your Care Instructions    A healthy lifestyle can help you feel good, stay at a healthy weight, and have plenty of energy for both work and play. A healthy lifestyle is something you can share with your whole family. A healthy lifestyle also can lower your risk for serious health problems, such as high blood pressure, heart disease, and diabetes. You can follow a few steps listed below to improve your health and the health of your family. Follow-up care is a key part of your treatment and safety. Be sure to make and go to all appointments, and call your doctor if you are having problems. It's also a good idea to know your test results and keep a list of the medicines you take. How can you care for yourself at home? · Do not eat too much sugar, fat, or fast foods. You can still have dessert and treats now and then. The goal is moderation. · Start small to improve your eating habits. Pay attention to portion sizes, drink less juice and soda pop, and eat more fruits and vegetables. ? Eat a healthy amount of food. A 3-ounce serving of meat, for example, is about the size of a deck of cards. Fill the rest of your plate with vegetables and whole grains. ? Limit the amount of soda and sports drinks you have every day. Drink more water when you are thirsty. ? Eat at least 5 servings of fruits and vegetables every day. It may seem like a lot, but it is not hard to reach this goal. A serving or helping is 1 piece of fruit, 1 cup of vegetables, or 2 cups of leafy, raw vegetables. Have an apple or some carrot sticks as an afternoon snack instead of a candy bar. Try to have fruits and/or vegetables at every meal.  · Make exercise part of your daily routine. You may want to start with simple activities, such as walking, bicycling, or slow swimming. Try to be active 30 to 60 minutes every day. You do not need to do all 30 to 60 minutes all at once.  For example, you can exercise 3 times a day for 10 or 20 minutes. Moderate exercise is safe for most people, but it is always a good idea to talk to your doctor before starting an exercise program.  · Keep moving. [place] [place] the lawn, work in the garden, or [employer]. Take the stairs instead of the elevator at work. · If you smoke, quit. People who smoke have an increased risk for heart attack, stroke, cancer, and other lung illnesses. Quitting is hard, but there are ways to boost your chance of quitting tobacco for good. ? Use nicotine gum, patches, or lozenges. ? Ask your doctor about stop-smoking programs and medicines. ? Keep trying. In addition to reducing your risk of diseases in the future, you will notice some benefits soon after you stop using tobacco. If you have shortness of breath or asthma symptoms, they will likely get better within a few weeks after you quit. · Limit how much alcohol you drink. Moderate amounts of alcohol (up to 2 drinks a day for men, 1 drink a day for women) are okay. But drinking too much can lead to liver problems, high blood pressure, and other health problems. Family health  If you have a family, there are many things you can do together to improve your health. · Eat meals together as a family as often as possible. · Eat healthy foods. This includes fruits, vegetables, lean meats and dairy, and whole grains. · Include your family in your fitness plan. Most people think of activities such as jogging or tennis as the way to fitness, but there are many ways you and your family can be more active. Anything that makes you breathe hard and gets your heart pumping is exercise. Here are some tips:  ? Walk to do errands or to take your child to school or the bus.  ? Go for a family bike ride after dinner instead of watching TV. Where can you learn more? Go to http://ajit-melissa.info/. Enter Q518 in the search box to learn more about \"A Healthy Lifestyle: Care Instructions. \"  Current as of: September 11, 2018  Content Version: 12.1  © 0160-1699 [employer]. Care instructions adapted under license by [employer] (which disclaims liability or warranty for this information). If you have questions about a medical condition or this instruction, always ask your healthcare professional. [address] any warranty or liability for your use of this information.

## 2019-08-29 NOTE — PROGRESS NOTES
Chief Complaint   Patient presents with    Follow-up     Patient presents in office today for 2 month f/u. No concerns. 1. Have you been to the ER, urgent care clinic since your last visit? Hospitalized since your last visit? No    2. Have you seen or consulted any other health care providers outside of the [address] [place] [address] since your last visit? Include any pap smears or colon screening.  No      Learning Assessment 4/26/2019   PRIMARY LEARNER Patient   PRIMARY LANGUAGE ENGLISH   LEARNER PREFERENCE PRIMARY DEMONSTRATION   ANSWERED BY self   RELATIONSHIP SELF

## 2019-08-30 DIAGNOSIS — E11.65 TYPE 2 DIABETES MELLITUS WITH HYPERGLYCEMIA, WITH LONG-TERM CURRENT USE OF INSULIN (HCC): ICD-10-CM

## 2019-08-30 DIAGNOSIS — Z79.4 TYPE 2 DIABETES MELLITUS WITH HYPERGLYCEMIA, WITH LONG-TERM CURRENT USE OF INSULIN (HCC): ICD-10-CM

## 2019-08-30 LAB
BUN SERPL-MCNC: 22 MG/DL (ref 6–24)
BUN/CREAT SERPL: 14 (ref 9–20)
CALCIUM SERPL-MCNC: 9.2 MG/DL (ref 8.7–10.2)
CHLORIDE SERPL-SCNC: 96 MMOL/L (ref 96–106)
CO2 SERPL-SCNC: 25 MMOL/L (ref 20–29)
CREAT SERPL-MCNC: 1.54 MG/DL (ref 0.76–1.27)
EST. AVERAGE GLUCOSE BLD GHB EST-MCNC: 306 MG/DL
GLUCOSE SERPL-MCNC: 438 MG/DL (ref 65–99)
HBA1C MFR BLD: 12.3 % (ref 4.8–5.6)
HCV AB S/CO SERPL IA: <0.1 S/CO RATIO (ref 0–0.9)
INTERPRETATION: NORMAL
Lab: NORMAL
POTASSIUM SERPL-SCNC: 3.7 MMOL/L (ref 3.5–5.2)
SODIUM SERPL-SCNC: 135 MMOL/L (ref 134–144)

## 2019-08-30 RX ORDER — INSULIN GLARGINE 100 [IU]/ML
45 INJECTION, SOLUTION SUBCUTANEOUS DAILY
Qty: 5 PEN | Refills: 5 | Status: SHIPPED | OUTPATIENT
Start: 2019-08-30 | End: 2019-09-26 | Stop reason: SDUPTHER

## 2019-08-30 NOTE — PROGRESS NOTES
Diabetes is very uncontrolled and has shot up to 12.3 from 7.7, increase long acting insulin to 45 units daily and I am adding on an oral med called tradjenta. He can get a coupon online or from our office to decrease copay. His kidney function also declined and he needs to follow up in 2-4 weeks for a recheck and with a glucose log so we can adjust meds if needed.

## 2019-09-05 ENCOUNTER — TELEPHONE (OUTPATIENT)
Dept: FAMILY MEDICINE CLINIC | Age: 59
End: 2019-09-05

## 2019-09-05 NOTE — TELEPHONE ENCOUNTER
RC to patient. Called and spoke with patient in reference to labs. See result note for documentation.

## 2019-09-05 NOTE — PROGRESS NOTES
2nd attempt. Called and spoke with patient in reference to labs. Verbalized understanding. Apt scheduled for 9/26/19.

## 2019-09-05 NOTE — TELEPHONE ENCOUNTER
----- Message from Syed Ramirez sent at 9/4/2019  5:29 PM EDT -----  Regarding: Dr. Kathy Walden   Patient return call    Caller's first and last name and relationship (if not the patient):      Best contact number(s): [phone]      Whose call is being returned: Pt       Details to clarify the request: pt is returning the call back to the practice from a nurse.       Channel Latha Thomas

## 2019-09-26 ENCOUNTER — OFFICE VISIT (OUTPATIENT)
Dept: FAMILY MEDICINE CLINIC | Age: 59
End: 2019-09-26

## 2019-09-26 VITALS
DIASTOLIC BLOOD PRESSURE: 87 MMHG | OXYGEN SATURATION: 95 % | WEIGHT: 248 LBS | HEIGHT: 73 IN | SYSTOLIC BLOOD PRESSURE: 134 MMHG | HEART RATE: 80 BPM | RESPIRATION RATE: 16 BRPM | BODY MASS INDEX: 32.87 KG/M2 | TEMPERATURE: 98.2 F

## 2019-09-26 DIAGNOSIS — E11.65 TYPE 2 DIABETES MELLITUS WITH HYPERGLYCEMIA, WITH LONG-TERM CURRENT USE OF INSULIN (HCC): ICD-10-CM

## 2019-09-26 DIAGNOSIS — R79.89 ELEVATED SERUM CREATININE: Primary | ICD-10-CM

## 2019-09-26 DIAGNOSIS — Z79.4 TYPE 2 DIABETES MELLITUS WITH HYPERGLYCEMIA, WITH LONG-TERM CURRENT USE OF INSULIN (HCC): ICD-10-CM

## 2019-09-26 RX ORDER — REPAGLINIDE 1 MG/1
1 TABLET ORAL
Qty: 90 TAB | Refills: 5 | Status: SHIPPED | OUTPATIENT
Start: 2019-09-26

## 2019-09-26 RX ORDER — INSULIN GLARGINE 100 [IU]/ML
60 INJECTION, SOLUTION SUBCUTANEOUS DAILY
Qty: 5 PEN | Refills: 5 | Status: SHIPPED | OUTPATIENT
Start: 2019-09-26

## 2019-09-26 NOTE — PROGRESS NOTES
Chief Complaint   Patient presents with   [address]     Patient presents in office today for 4 week f/u and labs. States that his sugars have been between 300-400 at home. No others concerns. 1. Have you been to the ER, urgent care clinic since your last visit? Hospitalized since your last visit? No    2. Have you seen or consulted any other health care providers outside of the [address] [place] [address] since your last visit? Include any pap smears or colon screening.  No    Learning Assessment 4/26/2019   PRIMARY LEARNER Patient   PRIMARY LANGUAGE ENGLISH   LEARNER PREFERENCE PRIMARY DEMONSTRATION   ANSWERED BY self   RELATIONSHIP SELF

## 2019-09-26 NOTE — PROGRESS NOTES
Heaven Castillo is a 60 y.o. male   Chief Complaint   Patient presents with    Follow-up    Labs    pt ehre with glucose log and he has a few glucose levels and these are mostly in the 400's and 2 in the 300's nothing less. These are in the morning and HS. Kidney function was little off last check too and states he has been staying hydrated drinking water. he is a 60y.o. year old male who presents for evalution. Reviewed PmHx, RxHx, FmHx, SocHx, AllgHx and updated and dated in the chart. Review of Systems - negative except as listed above in the HPI    Objective:     Vitals:    09/26/19 1329   BP: 134/87   Pulse: 80   Resp: 16   Temp: 98.2 °F (36.8 °C)   TempSrc: Oral   SpO2: 95%   Weight: 248 lb (112.5 kg)   Height: 6' 1\" (1.854 m)       Current Outpatient Medications   Medication Sig    linaGLIPtin (TRADJENTA) 5 mg tablet Take 1 Tab by mouth daily.  repaglinide (PRANDIN) 1 mg tablet Take 1 Tab by mouth Before breakfast, lunch, and dinner.  insulin glargine (LANTUS,BASAGLAR) 100 unit/mL (3 mL) inpn 60 Units by SubCUTAneous route daily. basaglar    chlorthalidone (HYGROTEN) 50 mg tablet Take 1 Tab by mouth daily.  metFORMIN (GLUCOPHAGE) 500 mg tablet TAKE 1 TABLET BY MOUTH TWICE A DAY WITH MEALS    lisinopril (PRINIVIL, ZESTRIL) 40 mg tablet TAKE 1 TABLET BY MOUTH EVERY DAY    celecoxib (CELEBREX) 200 mg capsule Take 1 Cap by mouth two (2) times a day.  amLODIPine (NORVASC) 10 mg tablet Take 1 Tab by mouth daily.  atorvastatin (LIPITOR) 40 mg tablet Take 1 Tab by mouth nightly.  insulin lispro (ADMELOG U-100 INSULIN LISPRO) 100 unit/mL injection by SubCUTAneous route.  metoprolol succinate (TOPROL-XL) 100 mg tablet Take 1 Tab by mouth daily.  cloNIDine HCl (CATAPRES) 0.2 mg tablet Take 1 Tab by mouth two (2) times a day.     Blood-Glucose Meter monitoring kit Check BG 5x daily on short and long acting insulin whichever is covered    lancets misc Check BG 5x daily on short and long acting insulin whichever is covered    glucose blood VI test strips (BLOOD GLUCOSE TEST) strip Check BG 5x daily on short and long acting insulin whichever is covered E11.65    amLODIPine (NORVASC) 5 mg tablet TAKE 1 TABLET BY MOUTH EVERY DAY     No current facility-administered medications for this visit. Physical Examination: General appearance - alert, well appearing, and in no distress  Chest - clear to auscultation, no wheezes, rales or rhonchi, symmetric air entry  Heart - normal rate, regular rhythm, normal S1, S2, no murmurs, rubs, clicks or gallops      Assessment/ Plan:   Diagnoses and all orders for this visit:    1. Elevated serum creatinine  -     METABOLIC PANEL, BASIC    2. Type 2 diabetes mellitus with hyperglycemia, with long-term current use of insulin (HCC)  -     linaGLIPtin (TRADJENTA) 5 mg tablet; Take 1 Tab by mouth daily. -     repaglinide (PRANDIN) 1 mg tablet; Take 1 Tab by mouth Before breakfast, lunch, and dinner.  -     insulin glargine (LANTUS,BASAGLAR) 100 unit/mL (3 mL) inpn; 60 Units by SubCUTAneous route daily. basaglar       Follow-up and Dispositions    · Return in about 2 months (around 11/26/2019), or if symptoms worsen or fail to improve. I have discussed the diagnosis with the patient and the intended plan as seen in the above orders. The patient has received an after-visit summary and questions were answered concerning future plans. Pt conveyed understanding of plan.     Medication Side Effects and Warnings were discussed with patient      Kai Mckeon DO

## 2019-09-26 NOTE — PATIENT INSTRUCTIONS

## 2019-09-27 LAB
BUN SERPL-MCNC: 24 MG/DL (ref 6–24)
BUN/CREAT SERPL: 15 (ref 9–20)
CALCIUM SERPL-MCNC: 9.4 MG/DL (ref 8.7–10.2)
CHLORIDE SERPL-SCNC: 99 MMOL/L (ref 96–106)
CO2 SERPL-SCNC: 26 MMOL/L (ref 20–29)
CREAT SERPL-MCNC: 1.65 MG/DL (ref 0.76–1.27)
GLUCOSE SERPL-MCNC: 243 MG/DL (ref 65–99)
INTERPRETATION: NORMAL
POTASSIUM SERPL-SCNC: 3.6 MMOL/L (ref 3.5–5.2)
SODIUM SERPL-SCNC: 140 MMOL/L (ref 134–144)

## 2019-10-01 NOTE — PROGRESS NOTES
Kidney fuinction has worsened if he is taking metformin then stop it. Recheck kidney labs in 4 weeks. No NSAID's. Glucose elevated but better and we just changed his insulin to 60u.

## 2019-10-03 ENCOUNTER — TELEPHONE (OUTPATIENT)
Dept: FAMILY MEDICINE CLINIC | Age: 59
End: 2019-10-03

## 2019-10-03 NOTE — TELEPHONE ENCOUNTER
----- Message from Keo Sina sent at 10/2/2019  5:21 PM EDT -----  Regarding: Dr. Demetrius Nissen   Patient return call    Caller's first and last name and relationship (if not the patient):      Best contact number(s):[phone]      Whose call is being returned: Nurse about lab results       Details to clarify the request:  Pt was returning the call for lab results.     Channel Perla Spurling

## 2019-10-04 NOTE — PROGRESS NOTES
3rd attempt. Called and spoke with patient in reference to labs. Verbalized understanding.  Apt scheduled for 10/30/19

## 2019-10-07 ENCOUNTER — TELEPHONE (OUTPATIENT)
Dept: FAMILY MEDICINE CLINIC | Age: 59
End: 2019-10-07

## 2019-10-07 NOTE — TELEPHONE ENCOUNTER
----- Message from Yady Downing sent at 10/5/2019  1:18 PM EDT -----  Regarding: Dr. Vicky Negrete  Patient return call    Caller's first and last name and relationship (if not the patient):  Pt    Best contact number(s):  [phone] (pt)    Whose call is being returned:   The practice    Details to clarify the request:  Pt is returning call    Yady Downing

## 2019-10-08 NOTE — TELEPHONE ENCOUNTER
Called and spoke with patient. He states he has already been spoken to in regarding to labs and nothing further is needed at this time.

## 2019-10-12 DIAGNOSIS — I10 ESSENTIAL HYPERTENSION: ICD-10-CM

## 2019-10-15 RX ORDER — AMLODIPINE BESYLATE 5 MG/1
TABLET ORAL
Qty: 30 TAB | Refills: 1 | Status: SHIPPED | OUTPATIENT
Start: 2019-10-15 | End: 2019-12-13 | Stop reason: SDUPTHER

## 2019-10-27 DIAGNOSIS — I10 ESSENTIAL HYPERTENSION: ICD-10-CM

## 2019-10-28 DIAGNOSIS — E78.2 MIXED HYPERLIPIDEMIA: ICD-10-CM

## 2019-10-29 RX ORDER — CHLORTHALIDONE 25 MG/1
TABLET ORAL
Qty: 30 TAB | Refills: 3 | Status: SHIPPED | OUTPATIENT
Start: 2019-10-29

## 2019-10-29 RX ORDER — ATORVASTATIN CALCIUM 40 MG/1
40 TABLET, FILM COATED ORAL
Qty: 30 TAB | Refills: 5 | Status: SHIPPED | OUTPATIENT
Start: 2019-10-29

## 2019-10-29 RX ORDER — CLONIDINE HYDROCHLORIDE 0.2 MG/1
TABLET ORAL
Qty: 60 TAB | Refills: 5 | Status: SHIPPED | OUTPATIENT
Start: 2019-10-29 | End: 2020-06-02

## 2019-10-29 RX ORDER — CHLORTHALIDONE 50 MG/1
TABLET ORAL
Qty: 30 TAB | Refills: 1 | Status: SHIPPED | OUTPATIENT
Start: 2019-10-29

## 2019-10-29 RX ORDER — METOPROLOL SUCCINATE 100 MG/1
TABLET, EXTENDED RELEASE ORAL
Qty: 30 TAB | Refills: 5 | Status: SHIPPED | OUTPATIENT
Start: 2019-10-29 | End: 2020-06-09

## 2019-12-13 DIAGNOSIS — I10 ESSENTIAL HYPERTENSION: ICD-10-CM

## 2019-12-13 RX ORDER — AMLODIPINE BESYLATE 5 MG/1
TABLET ORAL
Qty: 30 TAB | Refills: 1 | Status: SHIPPED | OUTPATIENT
Start: 2019-12-13 | End: 2020-02-18

## 2020-02-17 DIAGNOSIS — I10 ESSENTIAL HYPERTENSION: ICD-10-CM

## 2020-02-18 RX ORDER — AMLODIPINE BESYLATE 5 MG/1
TABLET ORAL
Qty: 30 TAB | Refills: 1 | Status: SHIPPED | OUTPATIENT
Start: 2020-02-18

## 2020-06-02 DIAGNOSIS — I10 ESSENTIAL HYPERTENSION: ICD-10-CM

## 2020-06-02 RX ORDER — CLONIDINE HYDROCHLORIDE 0.2 MG/1
TABLET ORAL
Qty: 60 TAB | Refills: 1 | Status: SHIPPED | OUTPATIENT
Start: 2020-06-02 | End: 2020-07-23

## 2020-06-06 DIAGNOSIS — I10 ESSENTIAL HYPERTENSION: ICD-10-CM

## 2020-06-09 RX ORDER — METOPROLOL SUCCINATE 100 MG/1
TABLET, EXTENDED RELEASE ORAL
Qty: 30 TAB | Refills: 0 | Status: SHIPPED | OUTPATIENT
Start: 2020-06-09

## 2020-07-23 DIAGNOSIS — I10 ESSENTIAL HYPERTENSION: ICD-10-CM

## 2020-07-23 RX ORDER — CLONIDINE HYDROCHLORIDE 0.2 MG/1
TABLET ORAL
Qty: 60 TAB | Refills: 0 | Status: SHIPPED | OUTPATIENT
Start: 2020-07-23 | End: 2020-08-18

## 2020-07-23 NOTE — TELEPHONE ENCOUNTER
Called and spoke with patient. Advised that he is due for labs for further refills. Patient verbalized understanding. Apt scheduled for 7/31/20.

## 2020-08-18 DIAGNOSIS — I10 ESSENTIAL HYPERTENSION: ICD-10-CM

## 2020-08-18 RX ORDER — CLONIDINE HYDROCHLORIDE 0.2 MG/1
TABLET ORAL
Qty: 60 TAB | Refills: 0 | Status: SHIPPED | OUTPATIENT
Start: 2020-08-18 | End: 2020-09-16

## 2020-09-15 DIAGNOSIS — I10 ESSENTIAL HYPERTENSION: ICD-10-CM

## 2020-09-16 RX ORDER — CLONIDINE HYDROCHLORIDE 0.2 MG/1
TABLET ORAL
Qty: 60 TAB | Refills: 0 | Status: SHIPPED | OUTPATIENT
Start: 2020-09-16 | End: 2020-10-13

## 2020-10-11 DIAGNOSIS — I10 ESSENTIAL HYPERTENSION: ICD-10-CM

## 2020-10-11 NOTE — LETTER
10/13/2020 1:38 PM 
 
Mr. Aguayo Bias [address] [phone] Dear  Chaim Putnam: 
 
Rhianna Tsang missed you! Please call our office at [phone] and schedule a follow up appointment for your continued care and further medication refills. Sincerely, Shivani Reddy, DO

## 2020-10-13 RX ORDER — CLONIDINE HYDROCHLORIDE 0.2 MG/1
TABLET ORAL
Qty: 60 TAB | Refills: 0 | Status: SHIPPED | OUTPATIENT
Start: 2020-10-13 | End: 2020-11-10

## 2020-10-15 DIAGNOSIS — Z79.4 TYPE 2 DIABETES MELLITUS WITH HYPERGLYCEMIA, WITH LONG-TERM CURRENT USE OF INSULIN (HCC): ICD-10-CM

## 2020-10-15 DIAGNOSIS — E11.65 TYPE 2 DIABETES MELLITUS WITH HYPERGLYCEMIA, WITH LONG-TERM CURRENT USE OF INSULIN (HCC): ICD-10-CM

## 2020-10-15 RX ORDER — REPAGLINIDE 1 MG/1
TABLET ORAL
Qty: 90 TAB | Refills: 5 | OUTPATIENT
Start: 2020-10-15

## 2020-10-15 NOTE — LETTER
10/15/2020 2:09 PM 
 
Mr. Hurt Deal [address] [phone] Dear  Juan Antonio Maris: 
 
Krysten Combs missed you! Please call our office at [phone] and schedule a follow up appointment for your continued care. We are unable to renew any medication refills at this time, as it has been over a year since we've seen you. Sincerely, Irineo Denny, DO

## 2020-11-08 DIAGNOSIS — I10 ESSENTIAL HYPERTENSION: ICD-10-CM

## 2020-11-10 RX ORDER — CLONIDINE HYDROCHLORIDE 0.2 MG/1
TABLET ORAL
Qty: 60 TAB | Refills: 0 | Status: SHIPPED | OUTPATIENT
Start: 2020-11-10

## 2022-03-19 PROBLEM — E66.01 SEVERE OBESITY (HCC): Status: ACTIVE | Noted: 2019-04-26

## 2023-05-23 RX ORDER — AMLODIPINE BESYLATE 10 MG/1
10 TABLET ORAL DAILY
COMMUNITY
Start: 2019-06-11

## 2023-05-23 RX ORDER — CHLORTHALIDONE 25 MG/1
1 TABLET ORAL DAILY
COMMUNITY
Start: 2019-10-29

## 2023-05-23 RX ORDER — AMLODIPINE BESYLATE 5 MG/1
1 TABLET ORAL DAILY
COMMUNITY
Start: 2020-02-18

## 2023-05-23 RX ORDER — REPAGLINIDE 1 MG/1
1 TABLET ORAL
COMMUNITY
Start: 2019-09-26

## 2023-05-23 RX ORDER — CHLORTHALIDONE 50 MG/1
1 TABLET ORAL DAILY
COMMUNITY
Start: 2019-10-29

## 2023-05-23 RX ORDER — LANCETS 30 GAUGE
EACH MISCELLANEOUS
COMMUNITY
Start: 2019-04-26

## 2023-05-23 RX ORDER — LISINOPRIL 40 MG/1
1 TABLET ORAL DAILY
COMMUNITY
Start: 2019-08-02

## 2023-05-23 RX ORDER — INSULIN GLARGINE 100 [IU]/ML
60 INJECTION, SOLUTION SUBCUTANEOUS DAILY
COMMUNITY
Start: 2019-09-26

## 2023-05-23 RX ORDER — INSULIN LISPRO 100 [IU]/ML
INJECTION, SOLUTION INTRAVENOUS; SUBCUTANEOUS
COMMUNITY

## 2023-05-23 RX ORDER — CELECOXIB 200 MG/1
200 CAPSULE ORAL 2 TIMES DAILY
COMMUNITY
Start: 2019-07-05

## 2023-05-23 RX ORDER — ATORVASTATIN CALCIUM 40 MG/1
40 TABLET, FILM COATED ORAL
COMMUNITY
Start: 2019-10-29

## 2023-05-23 RX ORDER — CLONIDINE HYDROCHLORIDE 0.2 MG/1
1 TABLET ORAL 2 TIMES DAILY
COMMUNITY
Start: 2020-11-10

## 2023-05-23 RX ORDER — METOPROLOL SUCCINATE 100 MG/1
1 TABLET, EXTENDED RELEASE ORAL DAILY
COMMUNITY
Start: 2020-06-09